# Patient Record
Sex: MALE | Race: WHITE | Employment: FULL TIME | URBAN - METROPOLITAN AREA
[De-identification: names, ages, dates, MRNs, and addresses within clinical notes are randomized per-mention and may not be internally consistent; named-entity substitution may affect disease eponyms.]

---

## 2022-06-08 ENCOUNTER — HOSPITAL ENCOUNTER (OUTPATIENT)
Dept: PHYSICAL THERAPY | Age: 37
Discharge: HOME | End: 2022-06-08
Payer: OTHER GOVERNMENT

## 2022-06-08 PROCEDURE — 97535 SELF CARE MNGMENT TRAINING: CPT

## 2022-06-08 PROCEDURE — 97162 PT EVAL MOD COMPLEX 30 MIN: CPT

## 2022-06-08 NOTE — PROGRESS NOTES
[facility]  [address] [place], [address] [facility] [place]  Ph [phone]  Fx [phone]    Plan of Care/ Statement of Necessity for Physical Therapy Services    Patient name: Siri Hassan Start of Care: 2022   Referral source: Lulu Ulrich : 1985    Medical Diagnosis: Other symptoms and signs involving the genitourinary system [R39.89]   Onset Date:9/15/2021   Treatment Diagnosis:  Other symptoms and signs involving the genitourinary system                                              ICD-10: R39.89   Prior Hospitalization: see medical history Provider#: [id number]   Medications: Verified on Patient summary List    Comorbidities: hemorrhoid, anxiety, LBP injury with radiculopathy in  and 2021    Prior Level of Function: active lifestyle, 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 Assessment/ key information: Patient is a 43year old male presenting to Physical Therapy with c/o pelvic pain in the left perianal region  which is limiting ability function at previous level. Patient presents with a slight  decreased strength of postural stabilizers. Patient also presents with reduced lumbar lordosis. Pelvic floor assessment was deferred to next appointment due to time constraint  His pain in consistent with hypertonic pelvic floor muscles possibly the coccygeus or iliococcygeus. . Patient presents with limited understanding of bladder and bowel anatomy/function .  I feel patient would benefit from skilled therapeutic intervention to optimize highest functional level possible.        Evaluation Complexity History HIGH Complexity :3+ comorbidities / personal factors will impact the outcome/ POC ; Examination HIGH Complexity : 4+ Standardized tests and measures addressing body structure, function, activity limitation and / or participation in recreation  ;Presentation MEDIUM Complexity : Evolving with changing characteristics    Overall Complexity Rating: MEDIUM    Problem List: pain affecting function, decrease ROM, decrease ADL/ functional abilitiies, decrease activity tolerance and decrease flexibility/ joint mobility   Treatment Plan may include any combination of the following: Therapeutic exercise, Therapeutic activities, Neuromuscular re-education, Physical agent/modality, Gait/balance training, Manual therapy, Patient education, Self Care training and Functional mobility training    Patient / Family readiness to learn indicated by: asking questions, trying to perform skills and interest  Persons(s) to be included in education: patient (P)  Barriers to Learning/Limitations: None  Measures taken if barriers to learning: none  Patient Goal (s): cessation of symptoms  Patient Self Reported Health Status: good  Rehabilitation Potential: excellent  Short term goals: To be achieved in 4 weeks:      Patient will report improvement in pelvic pain to 5/10 at worst  Status at eval: Patient pelvic pain at worst = 9.5/10 and at best 1-2/10.     Patient will report being consistent with HEP   Status at eval:  HEP was given        Patient will report that the sensation of sitting on a marble will occur less than 3x/week. Status at eval:  Patient reports feeling as though he is sitting on a marble     Long term goals: To be achieved in 12 weeks:  Patient will report improvement in pelvic pain to 2/10 at worst  Status at eval: Patient pelvic pain at worst = 9.5/10 and at best 1-2/10.     Patient will report that the sensation of sitting on a marble will occur less than 1x/week.   Status at eval:  Patient reports feeling as though he is sitting on a marble     Patient will reports using stress reducing techniques to decrease his stress levels to avoid increasing pelvic floor tone  Status at eval:  Patient reported trying some techniques with some success        Patient will demonstrate independence with management tools and exercise program that are beneficial for current condition in order to feel comfortable with Pelvic floor PT D/C and not fear exacerbation of current condition or symptoms returning. Status at eval : patient is unaware of what exercises to do to decrease the symptoms and unaware of what activities to avoid to avoid exacerbation of current condition  Frequency / Duration: Patient to be seen 1 times per week for 12 weeks. Patient/ Caregiver education and instruction: Diagnosis, prognosis, self care, activity modification and exercises   [x]  Plan of care has been reviewed with PTA    Certification Period: ziyad Lawrence, PT 6/8/2022 3:24 PM    ________________________________________________________________________    I certify that the above Therapy Services are being furnished while the patient is under my care. I agree with the treatment plan and certify that this therapy is necessary.     Physician's Signature:_____________________Date:____________TIME:________                                      Dianna Waller PA-C      ** Signature, Date and Time must be completed for valid certification **  Please sign and return to [facility] [address], [address] [place], [place] [address]ludmila  Ph [phone]  Fx [phone]

## 2022-06-14 ENCOUNTER — HOSPITAL ENCOUNTER (OUTPATIENT)
Dept: PHYSICAL THERAPY | Age: 37
Discharge: HOME | End: 2022-06-14
Payer: OTHER GOVERNMENT

## 2022-06-14 PROCEDURE — 97140 MANUAL THERAPY 1/> REGIONS: CPT

## 2022-06-14 PROCEDURE — 97112 NEUROMUSCULAR REEDUCATION: CPT

## 2022-06-14 PROCEDURE — 97110 THERAPEUTIC EXERCISES: CPT

## 2022-06-14 NOTE — PROGRESS NOTES
PF DAILY TREATMENT NOTE    Patient Name: Yair Dubois  Date:2022  : 1985  [x]  Patient  Verified  Payor:  / Plan: Fabrizio Hamilton 74 / Product Type:  /    In time: 247  Out time: 334  Total Treatment Time (min):  47    For [facility] only  Total Timed Codes (min): 47  Of Timed Code minutes, 1:1 Treatment Time: 47     Visit #: 2 of 12    Treatment Area: Other symptoms and signs involving the genitourinary system [R39.89]    SUBJECTIVE  Pain Level (0-10 scale): 1-2/10 on left  Any medication changes, allergies to medications, adverse drug reactions, diagnosis change, or new procedure performed?: [x] No    [] Yes (see summary sheet for update)  Subjective functional status/changes:   [x] No changes reported    Patient reports less topical medicine this week (2x).    He reports being compliant with the HEP    OBJECTIVE      14 min Therapeutic Exercise:  [] See flow sheet :   []  Pelvic floor strengthening                 [x]  Pelvic floor downtraining  []  Quality pelvic floor contractions       [x]  Relaxation techniques  []  Urge suppression exercises  []  Other:  Rationale: increase ROM and improve coordination  to improve the patients ability to return to PLOF         25 min Neuromuscular Re-education:  []  See flow sheet :   []  Pelvic floor strengthening                 []  Pelvic floor downtraining  []  Quality pelvic floor contractions       []  Relaxation techniques  []  Urge suppression exercises  []  Other:assessment of the pelvic floor  Rationale:  Pelvic floor assessment was performed to determine \"tone\", tenderness of the pelvic floor to better guide the patient's treatments      8 min Manual Therapy:  Stuck drawer , Elaine's massage,  and STM   Rationale: decrease pain, increase ROM and increase tissue extensibility to return to PLOF             With   [] TE   [] TA   [] neuro  [] manual  [] self care   [] other: Patient Education: [x] Review HEP    [] Progressed/Changed HEP based on:   [] positioning   [] body mechanics   [] transfers   [] heat/ice application    [] other:      Other Objective/Functional Measures:   Pelvic floor assessment:  Rectal assessment of the pelvic floor was performed. Patient presents with increased \"tone\" of the pelvic floor. The right coccygeus presents with hypertonus compared to the left. Patient reported slight discomfort 1-2/10 within the left coccygeus. No difference was noted with the iliococcygeus (Left versus right). PERF was not assessed due to increased overall \"tone\". Elaine's massage was performed on bilateral coccygeus. Pain Level (0-10 scale) post treatment: 0/10    ASSESSMENT/Changes in Function: Patient tolerated today's session well with no c/o pain. Patient tolerated rectal pelvic floor assessment. His right coccygeus presents as hypertonic and he reported discomfort in the right coccygeus. Patient demonstrated understanding of today's instruction, education, and recommendations. Patient is progressing appropriately towards goals. Patient will continue to benefit from skilled PT services to modify and progress therapeutic interventions, address functional mobility deficits, address ROM deficits, address strength deficits, analyze and address soft tissue restrictions, analyze and cue movement patterns, analyze and modify body mechanics/ergonomics and assess and modify postural abnormalities to attain remaining goals. []  See Plan of Care  []  See progress note/recertification  []  See Discharge Summary         Progress towards goals / Updated goals:  Short term goals:  To be achieved in 4 weeks:        Patient will report improvement in pelvic pain to 5/10 at worst  Status at eval: Patient pelvic pain at worst = 9.5/10 and at best 1-2/10.     Patient will report being consistent with HEP   Status at eval:  HEP was given  Current:  Patient reports compliance with HEP and more stretches were issued. 6/14/2022        Patient will report that the sensation of sitting on a marble will occur less than 3x/week. Status at eval:  Patient reports feeling as though he is sitting on a marble     Long term goals: To be achieved in 12 weeks:  Patient will report improvement in pelvic pain to 2/10 at worst  Status at eval: Patient pelvic pain at worst = 9.5/10 and at best 1-2/10.     Patient will report that the sensation of sitting on a marble will occur less than 1x/week. Status at eval:  Patient reports feeling as though he is sitting on a marble     Patient will reports using stress reducing techniques to decrease his stress levels to avoid increasing pelvic floor tone  Status at eval:  Patient reported trying some techniques with some success        Patient will demonstrate independence with management tools and exercise program that are beneficial for current condition in order to feel comfortable with Pelvic floor PT D/C and not fear exacerbation of current condition or symptoms returning.    Status at eval : patient is unaware of what exercises to do to decrease the symptoms and unaware of what activities to avoid to avoid exacerbation of current condition       PLAN  []  Upgrade activities as tolerated     []  Continue plan of care  []  Update interventions per flow sheet       []  Discharge due to:_  []  Other:_      Charisma Rodriguez PT 6/14/2022  1:08 PM    Future Appointments   Date Time Provider Micehle Escobar   6/14/2022  2:45 PM Connor Peacock, PT [facility] THE [facility]

## 2022-06-21 ENCOUNTER — HOSPITAL ENCOUNTER (OUTPATIENT)
Dept: PHYSICAL THERAPY | Age: 37
Discharge: HOME | End: 2022-06-21
Payer: OTHER GOVERNMENT

## 2022-06-21 PROCEDURE — 97110 THERAPEUTIC EXERCISES: CPT

## 2022-06-21 PROCEDURE — 97112 NEUROMUSCULAR REEDUCATION: CPT

## 2022-06-21 PROCEDURE — 97140 MANUAL THERAPY 1/> REGIONS: CPT

## 2022-06-21 NOTE — PROGRESS NOTES
PF DAILY TREATMENT NOTE    Patient Name: Claus Kim  Date:2022  : 1985  [x]  Patient  Verified  Payor: MALA / Plan: Fabrizio Hamilton 74 / Product Type:  /    In [id number] Out time:123  Total Treatment Time (min): 49    For [facility] only  Total Timed Codes (min): 49  Of Timed Code minutes, 1:1 Treatment Time: 49     Visit #: 3 of 12    Treatment Area: Other symptoms and signs involving the genitourinary system [R39.89]    SUBJECTIVE  Pain Level (0-10 scale): 1-2/10  Any medication changes, allergies to medications, adverse drug reactions, diagnosis change, or new procedure performed?: [x] No    [] Yes (see summary sheet for update)  Subjective functional status/changes:   [x] No changes reported    Patient reports good compliance with his exercise program.  Patient reports not using his prescription medication (nifedipine) this week and only using the preparation H 2x. In April, he was using the nifedipine up to 6x/day. OBJECTIVE      10 min Therapeutic Exercise:  - See flow sheet :   -  Pelvic floor strengthening                 -  Pelvic floor downtraining  -  Quality pelvic floor contractions       -  Relaxation techniques  -  Urge suppression exercises  -  Other: TA activation  Rationale:  to increase strength of transversus abdominus to increase co activation of the pelvic floor musculature for increase lumbopelvic stabilization.           31 min Neuromuscular Re-education:  []  See flow sheet :   []  Pelvic floor strengthening                 []  Pelvic floor downtraining  []  Quality pelvic floor contractions       []  Relaxation techniques  []  Urge suppression exercises  []  Other: surface EMG  Rationale: Biofeedback was performed to determine the pelvic floor resting \"tone\", motor unit recruitment, endurance, and ability to relax from a work state to guide the treatments for the patient        8 min Manual Therapy:  Coccyx taping to allow coccygeus creep   Rationale: decrease pain and increase tissue extensibility to decrease pelvic floor discomfort       With   [] TE   [] TA   [] neuro  [] manual  [] self care   [] other: Patient Education: [x] Review HEP    [] Progressed/Changed HEP based on:   [] positioning   [] body mechanics   [] transfers   [] heat/ice application    [] other:      Other Objective/Functional Measures:   Biofeedback expectations and implications were reviewed with patient prior to intervention,   Performed sEMG with perianal electrodes as follows:    Position, initial resting tone Work/Rest/Reps Comments   Seated   5/10/10     Ave Resting at 1.7  uV    Min resting at  0.1   uV    Ave Work at   7.2     [employer] Work at  16.7 [employer] interval 5.59 uV   Seated   2/4/10   Ave Resting at 2.4   uV    Min resting at  0.1   [employer] Work at   6.1     [employer] Work at 12.4 [employer] interval 3.75 uV     Baseline prior to exercise in supine : Ave resting= 1.1   uV;  Min resting=0.3   uV; Max resting=  2.6  uV    Baseline prior to exercise in sitting : Ave resting=  1.4  uV; Min resting=  1.2 uV; Max resting=  3.1 uV    Pain Level (0-10 scale) post treatment: 1/10    ASSESSMENT/Changes in Function: Patient tolerated today's session well with no c/o pain. Patient consented to surface EMG. Patient demonstrated good ability to relax the pelvic floor, rapid motor recruitment, and decreased amount of motor unit recruitment. Core stability exercises were introduced. Patient demonstrated understanding of today's instruction, education, and recommendations. Patient is progressing appropriately towards goals.       [x]  Decrease pain [] No change [x]  Improving [] Resolved       Patient will continue to benefit from skilled PT services to modify and progress therapeutic interventions, address functional mobility deficits, address ROM deficits, address strength deficits, analyze and address soft tissue restrictions, analyze and cue movement patterns, analyze and modify body mechanics/ergonomics and assess and modify postural abnormalities to attain remaining goals. []  See Plan of Care  []  See progress note/recertification  []  See Discharge Summary         Progress towards goals / Updated goals:  Short term goals: To be achieved in 4 weeks:        Patient will report improvement in pelvic pain to 5/10 at worst  Status at eval: Patient pelvic pain at worst = 9.5/10 and at best 1-2/10. Current:  Patient reports pain as 1-2/10  Typically and occasionally no pain. He has reduced the use of medication. 6/21/2022 Progressing     Patient will report being consistent with HEP   Status at eval: Janneth Carpenter was given  Current:  Patient reports compliance with HEP and more stretches were issued. 6/14/2022        Patient will report that the sensation of sitting on a marble will occur less than 3x/week. Status at eval:  Patient reports feeling as though he is sitting on a marble     Long term goals: To be achieved in 12 weeks:  Patient will report improvement in pelvic pain to 2/10 at worst  Status at eval: Patient pelvic pain at worst = 9.5/10 and at best 1-2/10.     Patient will report that the sensation of sitting on a marble will occur less than 1x/week. Status at eval:  Patient reports feeling as though he is sitting on a marble     Patient will reports using stress reducing techniques to decrease his stress levels to avoid increasing pelvic floor tone  Status at eval:  Patient reported trying some techniques with some success        Patient will demonstrate independence with management tools and exercise program that are beneficial for current condition in order to feel comfortable with Pelvic floor PT D/C and not fear exacerbation of current condition or symptoms returning.    Status at eval : patient is unaware of what exercises to do to decrease the symptoms and unaware of what activities to avoid to avoid exacerbation of current condition       PLAN  []  Upgrade activities as tolerated     []  Continue plan of care  []  Update interventions per flow sheet       []  Discharge due to:_  []  Other:_      Tank Morgan, PT 6/21/2022  12:06 PM    Future Appointments   Date Time Provider Michele Escobar   6/21/2022 12:30 PM Kermit Brunner, PT [facility]   6/27/2022  8:45 AM Israel Benson [facility]   7/7/2022  8:45 AM Kermit Brunner, PT [facility]   7/13/2022  9:30 AM Israel Galloway, [address] THE [facility]   7/20/2022  9:30 AM Israel Benson, [address] THE [facility]   7/28/2022 10:15 AM Kermit Brunner, PT [facility]

## 2022-06-27 ENCOUNTER — HOSPITAL ENCOUNTER (OUTPATIENT)
Dept: PHYSICAL THERAPY | Age: 37
Discharge: HOME | End: 2022-06-27
Payer: OTHER GOVERNMENT

## 2022-06-27 PROCEDURE — 97110 THERAPEUTIC EXERCISES: CPT

## 2022-06-27 PROCEDURE — 97112 NEUROMUSCULAR REEDUCATION: CPT

## 2022-06-27 PROCEDURE — 97530 THERAPEUTIC ACTIVITIES: CPT

## 2022-06-27 NOTE — PROGRESS NOTES
PF DAILY TREATMENT NOTE    Patient Name: Ponce Schwab  Date:2022  : 1985  [x]  Patient  Verified  Payor:  / Plan: Fabrizio Hamilton 74 / Product Type:  /    In time:8:43  Out time:9:30  Total Treatment Time (min): 47    Visit #: 4 of 12    Treatment Area: Other symptoms and signs involving the genitourinary system [R39.89]    SUBJECTIVE  Pain Level (0-10 scale): 1/10  Any medication changes, allergies to medications, adverse drug reactions, diagnosis change, or new procedure performed?: [x] No    [] Yes (see summary sheet for update)  Subjective functional status/changes:   [x] No changes reported  Patient states his pain is more of an irritation than true pain today. He has not used the nifedipine but maybe 1x/day as needed. Patient states that he notes a influence of acute stress with pain. He states stressors in his life is low. OBJECTIVE    20 min Therapeutic Exercise:  [x] See flow sheet :   []  Pelvic floor strengthening                 []  Pelvic floor downtraining  []  Quality pelvic floor contractions       [x]  Relaxation techniques  []  Urge suppression exercises  [x]  Other: glutes, abdominals, lumbar stabilizers   Rationale: increase ROM, increase strength, improve coordination, improve balance and increase proprioception  to improve the patients ability to return to PLOF. 10 min Therapeutic Activity:  [x]  See flow sheet :    []  Increase Tissue extensibility        []  Assess fiber intake    []  Assess voiding habits  []  Assess bowel habits  []  Other:   Rationale: increase ROM, increase strength, improve coordination, improve balance and increase proprioception  to improve the patients ability to push, pull, squat at PLOF.     17 min Neuromuscular Re-education:  [x]  See flow sheet :   []  Pelvic floor strengthening                 []  Pelvic floor downtraining  []  Quality pelvic floor contractions       []  Relaxation techniques  []  Urge suppression exercises  [x]  Other: transversus abdominal activation  Rationale: increase ROM, increase strength, improve coordination, improve balance and increase proprioception  to improve the patients ability to activate abdominal muscles. With   [] TE   [] TA   [x] neuro  [] manual  [] self care   [] other: Patient Education: [x] Review HEP    [x] Progressed/Changed HEP based on:   [] positioning   [x] body mechanics   [] transfers   [] heat/ice application    [] other:      Pain Level (0-10 scale) post treatment: 0/10    ASSESSMENT/Changes in Function: Patient tolerated session well. Continuing to work on verbal and tactile cuing for abdominal activation and to improve patient's proprioception of deep core and pelvic floor. Patient tolerated addition of rows, extension, and paloff press, and bridges. Patient felt fatigued in his right lumbar paraspinals but had no pain with new exercises. Patient will continue to benefit from skilled PT services to modify and progress therapeutic interventions, address functional mobility deficits, address ROM deficits, address strength deficits, analyze and address soft tissue restrictions, analyze and cue movement patterns, analyze and modify body mechanics/ergonomics, assess and modify postural abnormalities, address imbalance/dizziness and instruct in home and community integration to attain remaining goals. [x]  See Plan of Care  []  See progress note/recertification  []  See Discharge Summary         Progress towards goals / Updated goals:  Short term goals: To be achieved in 4 weeks:     Patient will report improvement in pelvic pain to 5/10 at worst  Status at eval: Patient pelvic pain at worst = 9.5/10 and at best 1-2/10. Current:  Patient reports pain as 1-2/10  Typically and occasionally no pain. He has reduced the use of medication.   6/21/2022 Progressing     Patient will report being consistent with HEP   Status at eval: Emliiano Hermelinda was given  Current:  Patient reports compliance with HEP and more stretches were issued.  6/14/2022     Patient will report that the sensation of sitting on a marble will occur less than 3x/week. Status at eval:  Patient reports feeling as though he is sitting on a marble  Current: Patient reports feeling as though he is sitting on a marble less than ever but has a difficult time quantifying it. Currently, states he feels no marble sensation. 6/27/22     Long term goals: To be achieved in 12 weeks:  Patient will report improvement in pelvic pain to 2/10 at worst  Status at eval: Patient pelvic pain at worst = 9.5/10 and at best 1-2/10.     Patient will report that the sensation of sitting on a marble will occur less than 1x/week. Status at eval:  Patient reports feeling as though he is sitting on a marble     Patient will reports using stress reducing techniques to decrease his stress levels to avoid increasing pelvic floor tone  Status at eval:  Patient reported trying some techniques with some success        Patient will demonstrate independence with management tools and exercise program that are beneficial for current condition in order to feel comfortable with Pelvic floor PT D/C and not fear exacerbation of current condition or symptoms returning.    Status at eval : patient is unaware of what exercises to do to decrease the symptoms and unaware of what activities to avoid to avoid exacerbation of current condition       PLAN  []  Upgrade activities as tolerated     [x]  Continue plan of care  []  Update interventions per flow sheet       []  Discharge due to:_  []  Other:_      Leon Ortiz, PT 6/27/2022  8:05 AM    Future Appointments   Date Time Provider Michele Escobar   6/27/2022  8:45 AM Tylor Cunningham [address] THE [facility]   7/7/2022  8:45 AM Judy Brar, [address] THE [facility]   7/13/2022  9:30 AM Tylor Cunningham [address] THE [facility]   7/20/2022  9:30 AM Tylor Cunningham [address] THE [facility]   7/28/2022 10:15 AM Judy Brar [address] THE [facility]

## 2022-07-07 ENCOUNTER — HOSPITAL ENCOUNTER (OUTPATIENT)
Dept: PHYSICAL THERAPY | Age: 37
Discharge: HOME | End: 2022-07-07
Payer: OTHER GOVERNMENT

## 2022-07-07 PROCEDURE — 97110 THERAPEUTIC EXERCISES: CPT

## 2022-07-07 PROCEDURE — 97112 NEUROMUSCULAR REEDUCATION: CPT

## 2022-07-07 PROCEDURE — 97530 THERAPEUTIC ACTIVITIES: CPT

## 2022-07-07 NOTE — PROGRESS NOTES
PF DAILY TREATMENT NOTE    Patient Name: Florina Garcia  RLFM:[id number]  : 1985  [x]  Patient  Verified  Payor:  / Plan: Fabrizio Hamilton 72 / Product Type:  /    In time:845  Out time:935  Total Treatment Time (min): 50    For [facility] only  Total Timed Codes (min): 50  Of Timed Code minutes, 1:1 Treatment Time: 50     Visit #: 5 of 12    Treatment Area: Other symptoms and signs involving the genitourinary system [R39.89]    SUBJECTIVE  Pain Level (0-10 scale): 1/10  Any medication changes, allergies to medications, adverse drug reactions, diagnosis change, or new procedure performed?: [x] No    [] Yes (see summary sheet for update)  Subjective functional status/changes:   [x] No changes reported    Patient reports typically no pain in perianal region. He reports having some soreness in right lumbar region. He reported seeing blood after having a bowel movement. PT recommended speaking with his primary care physician.      OBJECTIVE      30 min Therapeutic Exercise:  [] See flow sheet :   [x]  Pelvic floor strengthening                 [x]  Pelvic floor downtraining  [x]  Quality pelvic floor contractions       [x]  Relaxation techniques  []  Urge suppression exercises  []  Other:  Rationale: increase ROM, increase strength and improve coordination  to improve the patients ability to perform ADLs with increased ease       8 min Therapeutic Activity:  []  See flow sheet :    []  Increase Tissue extensibility        []  Assess fiber intake    []  Assess voiding habits  []  Assess bowel habits  []  Other: assessed goals and FOTO   Rationale: Goals were assessed to determine the efficacy of the treatments          12 min Neuromuscular Re-education:  []  See flow sheet :   [x]  Pelvic floor strengthening                 []  Pelvic floor downtraining  []  Quality pelvic floor contractions       []  Relaxation techniques  []  Urge suppression exercises  []  Other: Core stability with activity  Rationale:  to increase strength of transversus abdominus to increase co activation of the pelvic floor musculature for increase lumbopelvic stabilization. With   [] TE   [] TA   [] neuro  [] manual  [] self care   [] other: Patient Education: [x] Review HEP    [] Progressed/Changed HEP based on:   [] positioning   [] body mechanics   [] transfers   [] heat/ice application    [] other:      Other Objective/Functional Measures:   FOTO = \"no difficulty\" for all tasks    Pain Level (0-10 scale) post treatment: 0/10    ASSESSMENT/Changes in Function: Patient is a 43year old male who has attended five physical therapy appointments for pelvic pain in left perianal region. Patient is progressing well and reports pain at most 1-2/10 and typically does not have pain. He reports not using any external medication for his perianal region. Patient reports not having the \"sitting on a marble\" sensation 98% of the time He is learning to manage his stress levels. Patient demonstrates poor posture with forward head, increased thoracic curve, and decreased lumbar lordosis. He demonstrates decreased lumbar movement. He will benefit from continued physical therapy to address the remaining deficits. [x]  Decrease hypertonus [] No change [x]  Improving [] Resolved     [x]  Decrease pain [] No change [x]  Improving [] Resolved     [x]  Increased coordination [] No change [x]  Improving [] Resolved       Patient will continue to benefit from skilled PT services to modify and progress therapeutic interventions, address functional mobility deficits, address ROM deficits, address strength deficits, analyze and address soft tissue restrictions, analyze and cue movement patterns, analyze and modify body mechanics/ergonomics and assess and modify postural abnormalities to attain remaining goals.      []  See Plan of Care  [x]  See progress note/recertification  []  See Discharge Summary         Progress towards goals / Updated goals:  Short term goals: To be achieved in 4 weeks:     Patient will report improvement in pelvic pain to 5/10 at worst  Status at eval: Patient pelvic pain at worst = 9.5/10 and at best 1-2/10. Current:  Patient reports pain as 1-2/10  Typically and occasionally no pain.  He has reduced the use of medication.  6/21/2022 Progressing  Current: Patient reports pain at worst 1-2/10. Typically no pain . He has not used medication this past week. D/C GOAL  7/7/2022     Patient will report being consistent with HEP   Status at eval: Palisade Matas was given  Current:  Patient reports compliance with HEP and more stretches were issued.  6/14/2022   Current:  Patient reports compliance with HEP.  7/7/2022     Patient will report that the sensation of sitting on a marble will occur less than 3x/week. Status at eval:  Patient reports feeling as though he is sitting on a marble  Current: Patient reports feeling as though he is sitting on a marble less than ever but has a difficult time quantifying it. Currently, states he feels no marble sensation. 6/27/22  Current:  Patient reports no longer feeling like he is sitting on a marble 98% of the time. 7/7/2022     Long term goals: To be achieved in 7 weeks:  Patient will report improvement in pelvic pain to 2/10 at worst  Status at eval: Patient pelvic pain at worst = 9.5/10 and at best 1-2/10. Current: Patient reports pain at worst 1-2/10. Typically no pain . He has not used medication this past week. Will maintain until consistent. 7/7/2022 Progressing     Patient will report that the sensation of sitting on a marble will occur less than 1x/week. Status at eval:  Patient reports feeling as though he is sitting on a marble   Current:  Patient reports no longer feeling like he is sitting on a marble 98% of the time.   7/7/2022    Patient will reports using stress reducing techniques to decrease his stress levels to avoid increasing pelvic floor tone  Status at eval:  Patient reported trying some techniques with some success  Current:  Patient is on medication to reduce anxiety which has reduced his frustration level. He is journaling. 7/7/2022 GOAL MET      Patient will demonstrate independence with management tools and exercise program that are beneficial for current condition in order to feel comfortable with Pelvic floor PT D/C and not fear exacerbation of current condition or symptoms returning. Status at eval : patient is unaware of what exercises to do to decrease the symptoms and unaware of what activities to avoid to avoid exacerbation of current condition  Current:  Patient reports using techniques and HEP consistently. Progressing 7/7/2022    NEW GOAL to be accomplished in 7 weeks:    Patient will demonstrate the ability to correct his posture and will report performing posture checks throughout the day to reduce lumbopelvic hypotonicity to allow him to perform ADLs with increased ease  Current status: Forward head, increased thoracic kyphosis, decreased lumbar lordosis. He tends to sacral sit.   7/7/2022  PLAN  []  Upgrade activities as tolerated     []  Continue plan of care  []  Update interventions per flow sheet       []  Discharge due to:_  []  Other:_      Flip Persaud, PT 7/7/2022  8:14 AM    Future Appointments   Date Time Provider Michele Escobar   7/7/2022  8:45 AM Brittany Kothari PT [facility]   7/13/2022  9:30 AM Felecia Curtis, [address] [facility]   7/20/2022  9:30 AM Felecia Curtis, [address] [facility]   7/28/2022 10:15 AM Brittany Kothari PT [facility]

## 2022-07-07 NOTE — PROGRESS NOTES
[facility]  [address], [address] [facility] [place]  Ph [phone]  Fx [phone]    Physical Therapy Progress Note  Patient name: Maciel Abbott Start of Care: 2022   Referral source: Estefaníaroselyn Reagan : 1985                Medical Diagnosis: Other symptoms and signs involving the genitourinary system [R39.89]    Onset Date:9/15/2021   Treatment Diagnosis:  Other symptoms and signs involving the genitourinary system                                              ICD-10: R39.89   Prior Hospitalization: see medical history Provider#: [id number]   Medications: Verified on Patient summary List    Comorbidities: hemorrhoid, anxiety, LBP injury with radiculopathy in  and 2021    Prior Level of Function: active lifestyle, 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 Current:  Patient reports pain as 1-2/10  Typically and occasionally no pain.  He has reduced the use of medication.  2022 Progressing  Current: Patient reports pain at worst 1-2/10. Typically no pain . He has not used medication this past week. D/C GOAL  2022     Patient will report being consistent with HEP   Status at [facility]: Sravanthi El was given  Current:  Patient reports compliance with HEP and more stretches were issued.  2022   Current:  Patient reports compliance with HEP.  2022     Patient will report that the sensation of sitting on a marble will occur less than 3x/week. Status at eval:  Patient reports feeling as though he is sitting on a marble  Current: Patient reports feeling as though he is sitting on a marble less than ever but has a difficult time quantifying it. Currently, states he feels no marble sensation. 6/27/22  Current:  Patient reports no longer feeling like he is sitting on a marble 98% of the time. 7/7/2022     Long term goals: To be achieved in 7 weeks:  Patient will report improvement in pelvic pain to 2/10 at worst  Status at eval: Patient pelvic pain at worst = 9.5/10 and at best 1-2/10. Current: Patient reports pain at worst 1-2/10. Typically no pain . He has not used medication this past week. Will maintain until consistent. 7/7/2022 Progressing     Patient will report that the sensation of sitting on a marble will occur less than 1x/week. Status at eval:  Patient reports feeling as though he is sitting on a marble   Current:  Patient reports no longer feeling like he is sitting on a marble 98% of the time. 7/7/2022     Patient will reports using stress reducing techniques to decrease his stress levels to avoid increasing pelvic floor tone  Status at eval:  Patient reported trying some techniques with some success  Current:  Patient is on medication to reduce anxiety which has reduced his frustration level. He is journaling. 7/7/2022 GOAL MET      Patient will demonstrate independence with management tools and exercise program that are beneficial for current condition in order to feel comfortable with Pelvic floor PT D/C and not fear exacerbation of current condition or symptoms returning. Status at eval : patient is unaware of what exercises to do to decrease the symptoms and unaware of what activities to avoid to avoid exacerbation of current condition  Current:  Patient reports using techniques and HEP consistently. Progressing 7/7/2022     NEW GOAL to be accomplished in 7 weeks:     Patient will demonstrate the ability to correct his posture and will report performing posture checks throughout the day to reduce lumbopelvic hypotonicity to allow him to perform ADLs with increased ease  Current status:   Forward head, increased thoracic kyphosis, decreased lumbar lordosis. He tends to sacral sit. 7/7/2022    Summary of Care/ Key Functional Changes:  Patient is a 43year old male who has attended five physical therapy appointments for pelvic pain in left perianal region. Patient is progressing well and reports pain at most 1-2/10 and typically does not have pain. He reports not using any external medication for his perianal region. Patient reports not having the \"sitting on a marble\" sensation 98% of the time He is learning to manage his stress levels. Patient demonstrates poor posture with forward head, increased thoracic curve, and decreased lumbar lordosis. He demonstrates decreased lumbar movement. He will benefit from continued physical therapy to address the remaining deficits.       ASSESSMENT/RECOMMENDATIONS:  [x]Continue therapy per initial plan/protocol at a frequency of  1 x per week for 7 weeks  []Continue therapy with the following recommended changes:_____________________ _____________________________ ________________________________________  []Discontinue therapy progressing towards or have reached established goals  []Discontinue therapy due to lack of appreciable progress towards goals  []Discontinue therapy due to lack of attendance or compliance  []Await Physician's recommendations/decisions regarding therapy  []Other:________________________________________________________________    Thank you for this referral.   Gabriel Kulkarni, PT 7/7/2022 8:17 AM

## 2022-07-13 ENCOUNTER — HOSPITAL ENCOUNTER (OUTPATIENT)
Dept: PHYSICAL THERAPY | Age: 37
Discharge: HOME | End: 2022-07-13
Payer: OTHER GOVERNMENT

## 2022-07-13 PROCEDURE — 97112 NEUROMUSCULAR REEDUCATION: CPT

## 2022-07-13 PROCEDURE — 97110 THERAPEUTIC EXERCISES: CPT

## 2022-07-13 NOTE — PROGRESS NOTES
PF DAILY TREATMENT NOTE    Patient Name: Andrew Madrid  Date:2022  : 1985  [x]  Patient  Verified  Payor:  / Plan: [facility] / Product Type:  /    In time:9:46  Out time:10:17  Total Treatment Time (min): 31    Visit #: 6 of 12    Treatment Area: Other symptoms and signs involving the genitourinary system [R39.89]    SUBJECTIVE  Pain Level (0-10 scale): 0/10  Any medication changes, allergies to medications, adverse drug reactions, diagnosis change, or new procedure performed?: [x] No    [] Yes (see summary sheet for update)  Subjective functional status/changes:   [x] No changes reported  Patient states he no longer is having pelvic pain, instead describes it more as an irritation. He continues to have right sided lumbar fatigue with band exercises but feels it is getting stronger. OBJECTIVE    15 min Therapeutic Exercise:  [x] See flow sheet :   []  Pelvic floor strengthening                 []  Pelvic floor downtraining  []  Quality pelvic floor contractions       []  Relaxation techniques  []  Urge suppression exercises  []  Other:  Rationale: increase ROM, increase strength, improve coordination, improve balance and increase proprioception  to improve the patients ability to restore PLOF. 16 min Neuromuscular Re-education:  [x]  See flow sheet :   []  Pelvic floor strengthening                 []  Pelvic floor downtraining  []  Quality pelvic floor contractions       []  Relaxation techniques  []  Urge suppression exercises  []  Other:  Rationale: increase strength, improve coordination, improve balance and increase proprioception  to improve the patients ability to activate abdominal and lumbar stabilizers, improved lumbopelvic stability, and improve work postures.      With   [x] TE   [] TA   [x] neuro  [] manual  [] self care   [] other: Patient Education: [x] Review HEP    [x] Progressed/Changed HEP based on:   [] positioning   [] body mechanics   [] transfers   [] heat/ice application    [] other:      Other Objective/Functional Measures: see goals    Pain Level (0-10 scale) post treatment: 0/10    ASSESSMENT/Changes in Function: Patient tolerated treatment well. Had to complete a shortened treatment session as patient was late as his GPS failed. Introduced progressed core and lumbar stabilization with dead bug, bird dog and 90/90 marches. Patient had difficulty coordinating UE and LE movements with core activation. Used bar over back for tactile feedback during bird dog exercises to help patient keep his lumbar spine in neutral. Patient is progressing well towards short and long term goals. [x]  Decrease pain [] No change [x]  Improving [] Resolved     [x]  Increased coordination [] No change [x]  Improving [] Resolved       Patient will continue to benefit from skilled PT services to modify and progress therapeutic interventions, address functional mobility deficits, address ROM deficits, address strength deficits, analyze and address soft tissue restrictions, analyze and cue movement patterns, analyze and modify body mechanics/ergonomics, assess and modify postural abnormalities, address imbalance/dizziness and instruct in home and community integration to attain remaining goals. [x]  See Plan of Care  []  See progress note/recertification  []  See Discharge Summary         Progress towards goals / Updated goals:  Short term goals: To be achieved in 4 weeks:     Patient will report improvement in pelvic pain to 5/10 at worst  Status at eval: Patient pelvic pain at worst = 9.5/10 and at best 1-2/10.   Current:  Patient reports pain as 1-2/10  Typically and occasionally no pain.  He has reduced the use of medication.  6/21/2022 Progressing  Current: Patient reports pain at worst 1-2/10.  Typically no pain .  He has not used medication this past week.  D/C GOAL  7/7/2022     Patient will report being consistent with HEP   Status at eval:  HEP was given  Current:  Patient given updated HEP  7/13/2022     Patient will report that the sensation of sitting on a marble will occur less than 3x/week. Status at eval:  Patient reports feeling as though he is sitting on a marble  Current:  Patient reports no longer feeling like he is sitting on a marble 98% of the time.  7/7/2022     Long term goals: To be achieved in 7 weeks:  Patient will report improvement in pelvic pain to 2/10 at worst  Status at eval: Patient pelvic pain at worst = 9.5/10 and at best 1-2/10. Current: Patient reports pain at worst 1-2/10.  Typically no pain .  He has not used medication this past week. Will maintain until consistent.   7/7/2022 Progressing     Patient will report that the sensation of sitting on a marble will occur less than 1x/week. Status at eval:  Patient reports feeling as though he is sitting on a marble   Current:  Patient reports no longer feeling like he is sitting on a marble 98% of the time.  7/7/2022     Patient will reports using stress reducing techniques to decrease his stress levels to avoid increasing pelvic floor tone  Status at eval:  Patient reported trying some techniques with some success  Current:  Patient is on medication to reduce anxiety which has reduced his frustration level.  He is journaling.  7/7/2022 GOAL MET      Patient will demonstrate independence with management tools and exercise program that are beneficial for current condition in order to feel comfortable with Pelvic floor PT D/C and not fear exacerbation of current condition or symptoms returning.    Status at eval : patient is unaware of what exercises to do to decrease the symptoms and unaware of what activities to avoid to avoid exacerbation of current condition  Current:  Patient reports using techniques and HEP consistently.  Progressing 7/7/2022     NEW GOAL to be accomplished in 7 weeks:     Patient will demonstrate the ability to correct his posture and will report performing posture checks throughout the day to reduce lumbopelvic hypotonicity to allow him to perform ADLs with increased ease  Current status:  Forward head, increased thoracic kyphosis, decreased lumbar lordosis.  He tends to sacral sit.  7/7/2022    PLAN  []  Upgrade activities as tolerated     [x]  Continue plan of care  []  Update interventions per flow sheet       []  Discharge due to:_  []  Other:_      Beula Harness, PT 7/13/2022  7:54 AM    Future Appointments   Date Time Provider Michele Escobar   7/13/2022  9:30 AM Joycelyn Rodrigez, [facility] [employer] [facility]   7/20/2022  9:30 AM Joycelyn Rodrigez, [facility] [employer] [facility]   7/28/2022 10:15 AM Ludwig Moran, PT [facility]

## 2022-07-20 ENCOUNTER — HOSPITAL ENCOUNTER (OUTPATIENT)
Dept: PHYSICAL THERAPY | Age: 37
Discharge: HOME | End: 2022-07-20
Payer: OTHER GOVERNMENT

## 2022-07-20 PROCEDURE — 97112 NEUROMUSCULAR REEDUCATION: CPT

## 2022-07-20 PROCEDURE — 97110 THERAPEUTIC EXERCISES: CPT

## 2022-07-20 NOTE — PROGRESS NOTES
PF DAILY TREATMENT NOTE    Patient Name: Zoltan Rader  Date:2022  : 1985  [x]  Patient  Verified  Payor:  / Plan: Cindy Jointer / Product Type:  /    In time:9:30  Out time:10:14  Total Treatment Time (min): 44    Visit #: 7 of 12    Treatment Area: Other symptoms and signs involving the genitourinary system [R39.89]    SUBJECTIVE  Pain Level (0-10 scale): 0/10  Any medication changes, allergies to medications, adverse drug reactions, diagnosis change, or new procedure performed?: [x] No    [] Yes (see summary sheet for update)  Subjective functional status/changes:   [x] No changes reported    Patient reports he has not had to use any of his pain relieving cream or preparation H. He was able to move some heavy items around with no pain. He states it has been almost a month since he has had the \"sitting on a marble\" sensation. OBJECTIVE    25 min Therapeutic Exercise:  [x] See flow sheet :   []  Pelvic floor strengthening                 []  Pelvic floor downtraining  []  Quality pelvic floor contractions       []  Relaxation techniques  []  Urge suppression exercises  []  Other:  Rationale: increase ROM, increase strength, improve coordination, improve balance, and increase proprioception  to improve the patients ability to restore PLOF. 19 min Neuromuscular Re-education:  [x]  See flow sheet :   []  Pelvic floor strengthening                 []  Pelvic floor downtraining  []  Quality pelvic floor contractions       []  Relaxation techniques  []  Urge suppression exercises  []  Other:  Rationale: increase ROM, increase strength, improve coordination, improve balance, and increase proprioception  to improve the patients ability to improve postural strength and proprioception with home and work ADLs.       With   [x] TE   [] TA   [] neuro  [x] manual  [] self care   [] other: Patient Education: [x] Review HEP    [x] Progressed/Changed HEP based on:   [] positioning [] body mechanics   [] transfers   [] heat/ice application    [] other:      Other Objective/Functional Measures: see goals    Pain Level (0-10 scale) post treatment: 0/10    ASSESSMENT/Changes in Function: Patient tolerated treatment well. Patient had no complaints with addition of quadruped lawn mowers, side plank rows, side plank thread the needles and 90/90 fly for advanced abdominal and gluteal stabilization to improve postural stability. Patient able to complete bird dog exercises with improved lumbopelvic stability. Patient will continue to benefit from skilled PT services to modify and progress therapeutic interventions, address functional mobility deficits, address ROM deficits, address strength deficits, analyze and address soft tissue restrictions, analyze and cue movement patterns, analyze and modify body mechanics/ergonomics, assess and modify postural abnormalities, address imbalance/dizziness, and instruct in home and community integration to attain remaining goals. [x]  See Plan of Care  []  See progress note/recertification  []  See Discharge Summary         Progress towards goals / Updated goals:    Short term goals: To be achieved in 4 weeks:     Patient will report improvement in pelvic pain to 5/10 at worst  Status at eval: Patient pelvic pain at worst = 9.5/10 and at best 1-2/10. Current:  Patient reports pain as 1-2/10  Typically and occasionally no pain. He has reduced the use of medication. 6/21/2022 Progressing  Current: Patient reports pain at worst 1-2/10. Typically no pain . He has not used medication this past week. D/C GOAL  7/7/2022     Patient will report being consistent with HEP  Status at eval:  HEP was given  Current:  Patient given updated HEP  7/13/2022     Patient will report that the sensation of sitting on a marble will occur less than 3x/week.   Status at eval:  Patient reports feeling as though he is sitting on a marble  Current:  Patient reports no longer feeling like he is sitting on a marble. GOAL MET 7/20/2022     Long term goals: To be achieved in 7 weeks:  Patient will report improvement in pelvic pain to 2/10 at worst  Status at eval: Patient pelvic pain at worst = 9.5/10 and at best 1-2/10. Current: Patient reports pain at worst 1-2/10. Typically no pain . He has not used medication this past week. Will maintain until consistent. 7/7/2022 Progressing     Patient will report that the sensation of sitting on a marble will occur less than 1x/week. Status at eval:  Patient reports feeling as though he is sitting on a marble   Current:  Patient reports no longer feeling like he is sitting on a marble. GOAL MET 7/20/2022     Patient will reports using stress reducing techniques to decrease his stress levels to avoid increasing pelvic floor tone  Status at eval:  Patient reported trying some techniques with some success  Current:  Patient is on medication to reduce anxiety which has reduced his frustration level. He is journaling. 7/7/2022 GOAL MET      Patient will demonstrate independence with management tools and exercise program that are beneficial for current condition in order to feel comfortable with Pelvic floor PT D/C and not fear exacerbation of current condition or symptoms returning. Status at eval : patient is unaware of what exercises to do to decrease the symptoms and unaware of what activities to avoid to avoid exacerbation of current condition  Current:  Patient reports using techniques and HEP consistently. Progressing 7/7/2022     NEW GOAL to be accomplished in 7 weeks:     Patient will demonstrate the ability to correct his posture and will report performing posture checks throughout the day to reduce lumbopelvic hypotonicity to allow him to perform ADLs with increased ease  Current status: Forward head, increased thoracic kyphosis, decreased lumbar lordosis. He tends to sacral sit.   7/7/2022    PLAN  []  Upgrade activities as tolerated     [x]  Continue plan of care  []  Update interventions per flow sheet       []  Discharge due to:_  []  Other:_      Dereje Paez, PT 7/20/2022  9:00 AM    Future Appointments   Date Time Provider Michele Escobar   7/20/2022  9:30 AM Mei Baumann, [facility]   7/28/2022 10:15 AM Jose Alfredo Wilson, PT [facility] THE [facility]

## 2022-07-28 ENCOUNTER — HOSPITAL ENCOUNTER (OUTPATIENT)
Dept: PHYSICAL THERAPY | Age: 37
Discharge: HOME | End: 2022-07-28
Payer: OTHER GOVERNMENT

## 2022-07-28 PROCEDURE — 97112 NEUROMUSCULAR REEDUCATION: CPT

## 2022-07-28 PROCEDURE — 97110 THERAPEUTIC EXERCISES: CPT

## 2022-07-28 NOTE — PROGRESS NOTES
[facility]  [address], [address] [facility]  Ph [phone]  Fx [phone]    Physical Therapy Discharge Summary    Patient name: Ponce Schwab Start of Care: 2022   Referral source: Humberto Miranda : 1985                Medical Diagnosis: Other symptoms and signs involving the genitourinary system [R39.89]    Onset Date:9/15/2021   Treatment Diagnosis:  Other symptoms and signs involving the genitourinary system                                              ICD-10: R39.89   Prior Hospitalization: see medical history Provider#: [id number]   Medications: Verified on Patient summary List    Comorbidities: hemorrhoid, anxiety, LBP injury with radiculopathy in  and 2021    Prior Level of Function: active lifestyle, 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 Current:  Patient reports pain as 1-2/10  Typically and occasionally no pain. He has reduced the use of medication. 2022 Progressing  Current: Patient reports pain at worst 1-2/10. Typically no pain . He has not used medication this past week. D/C GOAL  2022     Patient will report being consistent with HEP  Status at eval:  HEP was given  Current:  Patient given updated HEP  2022  Current:  Patient reports being consistent with the HEP.  2022 D/C Goal     Patient will report that the sensation of sitting on a marble will occur less than 3x/week. Status at eval:  Patient reports feeling as though he is sitting on a marble  Current:  Patient reports no longer feeling like he is sitting on a marble.  GOAL MET 2022     Long term goals:   Patient will report improvement in pelvic pain to 2/10 at worst  Status at eval: Patient pelvic pain at worst = 9.5/10 and at best 1-2/10. Current: Patient reports pain at worst 1-2/10. Typically no pain . He has not used medication this past week. Will maintain until consistent. 7/7/2022 Progressing  Current:  Patient reports pain at worst 0.5/10 GOAL MET 7/28/2022     Patient will report that the sensation of sitting on a marble will occur less than 1x/week. Status at eval:  Patient reports feeling as though he is sitting on a marble   Current:  Patient reports no longer feeling like he is sitting on a marble. GOAL MET 7/20/2022     Patient will reports using stress reducing techniques to decrease his stress levels to avoid increasing pelvic floor tone  Status at eval:  Patient reported trying some techniques with some success  Current:  Patient is on medication to reduce anxiety which has reduced his frustration level. He is journaling. 7/7/2022 GOAL MET      Patient will demonstrate independence with management tools and exercise program that are beneficial for current condition in order to feel comfortable with Pelvic floor PT D/C and not fear exacerbation of current condition or symptoms returning. Status at eval : patient is unaware of what exercises to do to decrease the symptoms and unaware of what activities to avoid to avoid exacerbation of current condition  Current:  Patient reports using techniques and HEP consistently. Progressing 7/7/2022  Current:  Patient reports good compliance with HEP.  7/28/2022 GOAL MET     NEW GOAL to be accomplished in 7 weeks:     Patient will demonstrate the ability to correct his posture and will report performing posture checks throughout the day to reduce lumbopelvic hypotonicity to allow him to perform ADLs with increased ease  Current status: Forward head, increased thoracic kyphosis, decreased lumbar lordosis. He tends to sacral sit. 7/7/2022  Current:  Patient reports being more aware of his posture. D/C GOAL 7/28/2022       Assessment/ Summary of Care: Patient is a 43year old male who has attended eight physical therapy appointments for pelvic pain. Patient now reports no longer using nifedipine for pain relief (was using 6x/day) and that his pain is 0.5/10 at worst.  He reports no longer feeling as though he is sitting on a marble. Surface EMG biofeedback revealed that he is able to decrease the tone in his pelvic floor very well   Patient reports good compliance with his HEP which was advanced to more difficulty core stability exercises. He reports feeling comfortable with discharging from therapy.          RECOMMENDATIONS:  [x]Discontinue therapy: [x]Patient has reached or is progressing toward set goals      []Patient is non-compliant or has abdicated      []Due to lack of appreciable progress towards set goals    Khai Zepeda, PT 7/28/2022 10:44 AM

## 2022-07-28 NOTE — PROGRESS NOTES
Physical Therapy Discharge Instructions    [facility]  [address], [address] [facility]  Ph [phone]  Fx [phone]      Patient: Dina Osorio  : 1985      Continue Home Exercise Program 3 times per week.       Follow up with MD:     [] Upon completion of therapy     [x] As needed      Recommendations:     [x]   Return to activity with home program    []   Return to activity with the following modifications:       []Post Rehab Program    []Join Independent aquatic program     []Return to/join local gym                  Eric Zimmerman, PT 2022 10:59 AM

## 2022-07-28 NOTE — PROGRESS NOTES
PF DAILY TREATMENT NOTE    Patient Name: Lan Brock  Date:2022  : 1985  [x]  Patient  Verified  Payor: MALA / Plan: Fabrizio Hamilton 72 / Product Type: Fronie Goldmann /    In time:1018  Out time:1100  Total Treatment Time (min): 42    For [facility]/[employer] only  Total Timed Codes (min): 42  Of Timed Code minutes, 1:1 Treatment Time: 42     Visit #: 8 of 12    Treatment Area:  Other symptoms and signs involving the genitourinary system [R39.89]  SUBJECTIVE  Pain Level (0-10 scale): 0/10  Any medication changes, allergies to medications, adverse drug reactions, diagnosis change, or new procedure performed?: [x] No    [] Yes (see summary sheet for update)  Subjective functional status/changes:   [x] No changes reported    Patient reports no longer feeling like he is sitting on a marble    OBJECTIVE      34 min Therapeutic Exercise:  [] See flow sheet :   [x]  Pelvic floor strengthening                 []  Pelvic floor downtraining  [x]  Quality pelvic floor contractions       []  Relaxation techniques  []  Urge suppression exercises  []  Other:  Rationale: increase strength and improve coordination  to improve the patients ability to restore PLOF             8 min Neuromuscular Re-education:  [x]  See flow sheet :   [x]  Pelvic floor strengthening                 []  Pelvic floor downtraining  [x]  Quality pelvic floor contractions       []  Relaxation techniques  []  Urge suppression exercises  []  Other:  Rationale: increase ROM, increase strength, improve balance, and increase proprioception  to improve the patients ability to gain awareness of the pelvic floor             With   [] TE   [] TA   [] neuro  [] manual  [] self care   [] other: Patient Education: [x] Review HEP    [] Progressed/Changed HEP based on:   [] positioning   [] body mechanics   [] transfers   [] heat/ice application    [] other:        Pain Level (0-10 scale) post treatment: 0/10    ASSESSMENT/Changes in Function: Patient is a 43 year old male who has attended eight physical therapy appointments for pelvic pain. Patient now reports no longer using nifedipine for pain relief (was using 6x/day) and that his pain is 0.5/10 at worst.  He reports no longer feeling as though he is sitting on a marble. Surface EMG biofeedback revealed that he is able to decrease the tone in his pelvic floor very well   Patient reports good compliance with his HEP which was advanced to more difficulty core stability exercises. He reports feeling comfortable with discharging from therapy. []  See Plan of Care  []  See progress note/recertification  [x]  See Discharge Summary         Progress towards goals / Updated goals:  Short term goals: To be achieved in 4 weeks:     Patient will report improvement in pelvic pain to 5/10 at worst  Status at eval: Patient pelvic pain at worst = 9.5/10 and at best 1-2/10. Current:  Patient reports pain as 1-2/10  Typically and occasionally no pain. He has reduced the use of medication. 6/21/2022 Progressing  Current: Patient reports pain at worst 1-2/10. Typically no pain . He has not used medication this past week. D/C GOAL  7/7/2022     Patient will report being consistent with HEP  Status at eval:  HEP was given  Current:  Patient given updated HEP  7/13/2022  Current:  Patient reports being consistent with the HEP.  7/28/2022 D/C Goal     Patient will report that the sensation of sitting on a marble will occur less than 3x/week. Status at eval:  Patient reports feeling as though he is sitting on a marble  Current:  Patient reports no longer feeling like he is sitting on a marble. GOAL MET 7/20/2022     Long term goals: To be achieved in 7 weeks:  Patient will report improvement in pelvic pain to 2/10 at worst  Status at eval: Patient pelvic pain at worst = 9.5/10 and at best 1-2/10. Current: Patient reports pain at worst 1-2/10. Typically no pain . He has not used medication this past week.  Will maintain until consistent. 7/7/2022 Progressing  Current:  Patient reports pain at worst 0.5/10 GOAL MET 7/28/2022     Patient will report that the sensation of sitting on a marble will occur less than 1x/week. Status at eval:  Patient reports feeling as though he is sitting on a marble   Current:  Patient reports no longer feeling like he is sitting on a marble. GOAL MET 7/20/2022     Patient will reports using stress reducing techniques to decrease his stress levels to avoid increasing pelvic floor tone  Status at eval:  Patient reported trying some techniques with some success  Current:  Patient is on medication to reduce anxiety which has reduced his frustration level. He is journaling. 7/7/2022 GOAL MET      Patient will demonstrate independence with management tools and exercise program that are beneficial for current condition in order to feel comfortable with Pelvic floor PT D/C and not fear exacerbation of current condition or symptoms returning. Status at eval : patient is unaware of what exercises to do to decrease the symptoms and unaware of what activities to avoid to avoid exacerbation of current condition  Current:  Patient reports using techniques and HEP consistently. Progressing 7/7/2022  Current:  Patient reports good compliance with HEP.  7/28/2022 GOAL MET     NEW GOAL to be accomplished in 7 weeks:     Patient will demonstrate the ability to correct his posture and will report performing posture checks throughout the day to reduce lumbopelvic hypotonicity to allow him to perform ADLs with increased ease  Current status: Forward head, increased thoracic kyphosis, decreased lumbar lordosis. He tends to sacral sit. 7/7/2022  Current:  Patient reports being more aware of his posture.   D/C GOAL 7/28/2022    PLAN  []  Upgrade activities as tolerated     []  Continue plan of care  []  Update interventions per flow sheet       [x]  Discharge due to:_progressing towards or accomplishing all goals  [] Other:_      Agata Wilkins, PT 7/28/2022  9:51 AM    Future Appointments   Date Time Provider Michele Escobar   7/28/2022 10:15 AM Oc Gutierrez, [address] [facility] THE [facility]